# Patient Record
Sex: FEMALE | Race: WHITE | URBAN - METROPOLITAN AREA
[De-identification: names, ages, dates, MRNs, and addresses within clinical notes are randomized per-mention and may not be internally consistent; named-entity substitution may affect disease eponyms.]

---

## 2022-10-06 NOTE — PROGRESS NOTES
Marylin Carl  : 1990  Primary: [address]  Secondary:  [address] @ [facility]  [address] [address]  Phone: [phone]  Fax: [phone] Plan Frequency: one time a week for 90 days  Plan of Care/Certification Expiration Date: 23    PT Visit Info:  No data recorded   Visit Count:  1   OUTPATIENT PHYSICAL THERAPY:OP NOTE TYPE: Treatment Note 10/10/2022       Episode  }Appt Desk             Treatment Diagnosis:  Lack of coordination (muscle incoordination) (R27.8)  Pelvic floor dysfunction in female (M62.98)  Stress incontinence (female) (male) (N39.3)  Bladder pain (R39.89)  Dyspareunia (N94.1)  Medical/Referring Diagnosis:  Pelvic and perineal pain [R10.2]  Referring Physician:  RERE Teague MD Orders:  PT Eval and Treat   Date of Onset:  No data recorded   Allergies:   Patient has no allergy information on record. Restrictions/Precautions:  No data recorded  No data recorded   Interventions Planned (Treatment may consist of any combination of the following):    Current Treatment Recommendations: Strengthening; ROM; ADL/Self-care training; Neuromuscular re-education; Manual Therapy - Soft Tissue Mobilization; Pain management; Return to work related activity; Home exercise program; Equipment evaluation, education, & procurement; Modalities; Therapeutic activities     Subjective Comments:     Initial:}    6/10Post Session:        /10  Medications Last Reviewed:  10/10/2022  Updated Objective Findings:  See evaluation note from today  Treatment   THERAPEUTIC EXERCISE: (10 minutes):    Exercises per grid below to improve mobility, strength, and coordination. Required minimal visual, verbal, manual, and tactile cues to promote proper body alignment, promote proper body posture, promote proper body mechanics, and promote proper body breathing techniques.   Progressed resistance, range, repetitions, and complexity of movement as indicated. Date:  10-10-22 Date:   Date:     Activity/Exercise Parameters Parameters Parameters   Patient Education Discussed HEP and POC     Drops Reviewed and given handout     Surjit mclean Reviewed and given handout        All exercises performed with emphasis on kegel and breathing in order improve coordination, awareness and to minimize symptoms. MANUAL THERAPY: ( minutes): to improve soft tissue tone    Date Type Location Comments   10/10/2022 Internal assessment/treatment                                         (Used abbreviations: MET - muscle energy technique; SCS- Strain counter strain; CTM-Connective tissue mobilizations; CR- Contract/Relax; SP- Sustained pressure; PIT- Positional inhibition techniques; STM Soft -tissue mobilization; MM- Myofascial mobilization; TrP-Trigger point release; IASTM- Instrument assisted soft tissue mobilizations, TDN-Trigger point dry needling)    Pt gives verbal consent to internal vaginal assessment/treatment without chaperon present. NEURO REEDUCATION: () to improve control and coordination of pelvic floor     Date:   Date:   Date:     Activity/Exercise Parameters Parameters Parameters   Biofeedback With sEMG was utilized for coordination of PFM.                                                 THERAPEUTIC ACTIVITY: ( minutes):     TA Educational Topic Notes Date Completed   Pathology/Anatomy/PFM Function     Bladder health education     Urinary urge suppression     The knack     Voiding strategies     Nocturia tips     Bowel/Bladder log     Bowel health education     Constipation care     Diarrhea/Fecal leakage     Colonic massage     Toilet positioning     Defecation dynamics     Sources of fiber     Return to intercourse/Dilator training     Sexual positioning     Lubricants/vaginal moisturizers     Vaginal irritants     Body mechanics     Posture/ergonomics     Diaphragmatic breathing     Resources and technology          Treatment/Session Summary:    Treatment Assessment:     Communication/Consultation:  None today  Equipment provided today:  None  Recommendations/Intent for next treatment session: Next visit will focus on biofeedback. Total Treatment Billable Duration:  10 minutes  Time In: 1000  Time Out: [address]. Yang San, PT       Charge Capture  }Post Session Pain  PT Visit Info  [facility] Portal  MD Guidelines  Scanned Media  Benefits  MyChart    No future appointments.

## 2022-10-06 NOTE — THERAPY EVALUATION
Fabiano Keene  : 1990  Primary: [address]  Secondary:  [address] @ [facility]  [address] [address]  Phone: [phone]  Fax: [phone] Plan Frequency: one time a week for 90 days  Plan of Care/Certification Expiration Date: 23    PT Visit Info:         Visit Count:  1    OUTPATIENT PHYSICAL THERAPY:OP NOTE TYPE: Initial Assessment 10/10/2022               Episode  Appt Desk         Treatment Diagnosis:  Lack of coordination (muscle incoordination) (R27.8)  Pelvic floor dysfunction in female (M62.98)  Stress incontinence (female) (male) (N39.3)  Bladder pain (R39.89)  Dyspareunia (N94.1)  Medical/Referring Diagnosis:  Pelvic and perineal pain [R10.2]  Referring Physician:  RERE Bergeron MD Orders:  PT Eval and Treat   Return MD Appt:  2022  Date of Onset:      Allergies:  Patient has no allergy information on record. Restrictions/Precautions:           Medications Last Reviewed:  10/10/2022     SUBJECTIVE   History of Injury/Illness (Reason for Referral):  Ms. Gama Rodriguez is a 33 yo female referred to pelvic floor physical therapy (PFPT) by RERE Bergeron 2/2 Pelvic and perineal pain [R10.2] Pt reports that symptoms began 2022. Ended up having a UTI but didn't know and got septic so she went to ER. She did a few rounds of rochepin, fluids, pain medicine. She stayed a few days in the hospital. She has chronic UTIs. She gets treated with antibiotics. She has been treated with instillations. She has trace amounts of blood. She takes amitriplyine. She had pneumonia a few weeks ago and had to go on levaquin and this was the only thing that helped bladder pain. Urogesic blue makes it worse. She was diagnosed with IC in 2016. When she was pregnant 4 years ago she had a lot of pain. They kept telling her she had  UTI. She recently just started the bladder diet. She gets leakage with coughing/sneezing.  Leakage started before she had a kid. Urinary: Frequency 14+ x/day, 1-2 x/night. Positive for stress urinary incontinence, urinary urgency, urinary frequency, urinary hesitancy/intermittency, dysuria, and nocturia. Negative for urge urinary incontinence, incomplete emptying, hematuria, and nocturnal enuresis. Pt does use pads for urinary protection; 1+ pads per day (PPD). Fluid intake: 16 oz water/day; bladder irritants include: coke. Pt does not limit fluid intake due to bladder control. Bowel: Frequency variable D to C. Positive for pain with bowel movement, pushing/straining with bowel movement, fecal incontinence, constipation, and incomplete emptying. Negative for pain with bowel movement, pushing/straining with bowel movement, fecal incontinence, constipation, and incomplete emptying. Pt does use pads for bowel protection; 1 pads per day (PPD). Use of stool softeners or laxatives? No    Sexual: Pt is  sexually active with male partners. Contraception/birth control: none. positive for dyspareunia. Pain is superficial and deep. History of sexual abuse: No    Pelvic Organ Prolapse/Pelvic Pain: Location: 6/10 (current), 3/10 (best), 10/10 (worst). OB History: Number of pregnancies: 1 Number of vaginal deliveries: 1 Number of c-sections: 0. Ripped up towards urethra (nerve bundle)      Initial:     6/10 Post Session:      /10  Past Medical History/Comorbidities:   Ms. Lorine Kanner  has no past medical history on file. Ms. Lorine Kanner  has no past surgical history on file.   Social History/Living Environment:   Lives With: Family     Prior Level of Function/Work/Activity:   Prior level of function: Independent  Occupation: Unemployed  [address]: none           Learning:   Does the patient/guardian have any barriers to learning?: No barriers  Will there be a co-learner?: No  What is the preferred language of the patient/guardian?: English  Is an  required?: No  How does the patient/guardian prefer to learn new concepts?: Listening; Reading; Demonstration; Pictures/Videos     Fall Risk Scale: Spencer Total Score: 0  Spencer Fall Risk: Low (0-24)           OBJECTIVE   OBSERVATION:   External Observations:  Voluntary contraction: [] absent     [x] present  Involuntary contraction: [] absent     [x] present  Involuntary relaxation: [] absent     [x] present  Perineal descent at rest: [] absent     [x] present  Perineal descent with bearing: [] absent     [x] present      Pelvic Floor Muscle (PFM) Assessment:  Vaginal vault size: [] decreased     [] increased     [x] WNL  Muscle volume: [] decreased     [x] WNL     [] Defect  PFM tension: [] decreased     [x] increased     [] WNL    Contraction ability:  Voluntary contraction: [] absent     [x] weak     [] moderate      [] strong  Voluntary relaxation: [] absent     [x] partial     [] complete   MMT: 1/5   Quality of contractions: paradoxical movement of PFM  Overflow: [] absent     [x] min     [] mod     [] severe / Compensatory mm groups include abdominals  Levator Avulsion: [x] Negative  [] Positive    Tissue laxity test:  Anterior wall: [] minimum     [] moderate     [] severe      [x] WNL  Posterior wall: [] minimum     [] moderate     [] severe      [x] WNL      Palpation: via vaginal canal   Superficial Pelvic Floor Musculature (PFM): Tender? Intermediate PFM Tender? Deep PFM Tender?    Superficial Transverse Perineal [x] Right  [x] Left  []DNT Deep Transverse Perineal [x] Right  [x] Left  []DNT Puborectalis [x] Right  [x] Left  []DNT   Ischiocavernosus [] Right  [] Left  []DNT Compressor Urethra [] Right  [] Left  []DNT Pubococcygeus [x] Right  [x] Left  []DNT   Bulbocavernosus [] Right  [] Left  []DNT   Ileococcygeus [x] Right  [x] Left  []DNT       Obturator Internus [] Right  [] Left  []DNT       Coccygeus [] Right  [] Left  []DNT             ASSESSMENT   Initial Assessment:  Marylin Carl presents with musculoskeletal limitations including pelvic floor muscle (PFM) tension, reduced PFM Range of Motion (ROM), increased tenderness to palpation of the PFM, reduced coordination and awareness of PFM, abdominal soft tissue restrictions, and decreased pelvic floor muscle (PFM) strength. These limitations are impairing the patient's ability to properly coordinate perineal elevation and descent for normalized PFM function, contributing to sexual dysfunction and voiding dysfunction. As a result, she is limited in her/his ability to participate in household chores, physical activities such as walking, swimming, or other exercise, and social activities outside of the home. Problem List: (Impacting functional limitations): Body Structures, Functions, Activity Limitations Requiring Skilled Therapeutic Intervention: Decreased ADL status; Decreased ROM; Decreased strength; Decreased endurance; Decreased fine motor control; Decreased coordination; Increased pain     Therapy Prognosis:   Therapy Prognosis: Good     Assessment Complexity:   Low Complexity  PLAN   Effective Dates: 10-10-22 TO Plan of Care/Certification Expiration Date: 01/08/23   Frequency/Duration: Plan Frequency: one time a week for 90 days   Interventions Planned (Treatment may consist of any combination of the following):    Current Treatment Recommendations: Strengthening; ROM; ADL/Self-care training; Neuromuscular re-education; Manual Therapy - Soft Tissue Mobilization; Pain management; Return to work related activity; Home exercise program; Equipment evaluation, education, & procurement; Modalities; Therapeutic activities     Goals: (Goals have been discussed and agreed upon with patient.)  Short-Term Functional Goals: Time Frame: 6 weeks  Pt will demonstrate I with basic PFM HEP to improve awareness, coordination, and timing of PFM.   Patient will demonstrate understanding of and ability to teach back appropriate water intake, bladder irritants, toileting frequency, and positioning for improved self-management of symptoms. Patient will demonstrate ability to perform diaphragmatic breathing in multiple positions to assist in pelvic floor tension reduction. Patient will verbalize understanding and demonstrate postural adjustments which facilitate lengthening and reduce overall pelvic floor muscle activity. Discharge Goals: Time Frame: 12 weeks  Patient will be independent in a home dilator and/or graded exposure program, to be performed daily, in order to reduce pain and improved tolerance of tampon use, gynecological screening, and/or sexual intercourse. Patient will demonstrate independence with a home exercise program for aerobic conditioning, core stabilization, and general mobility for independent management of symptoms. Patient will demonstrate normal voluntary relaxation of the pelvic floor muscle group to improve pelvic floor ROM and tolerate pain free vaginal penetration. Pelvic Floor Impact Questionnaire--short form 7 (PFIQ-7):  Score:  Initial:   Bladder or Urine: 76/100  Bowel or Rectum: 76/100  Vagina or Pelvis: 76/100 Most Recent: X (Date: -- )  Bladder or Urine: X/100  Bowel or Rectum: X/100  Vagina or Pelvis: X/100   Interpretation of Score: Each of the 7 sections is scored on a scale from 0-3; 0 representing \"Not at all\", 3 representing \"Quite a bit\". The mean value is taken from all the answered items, then multiplied by 100 to obtain the scale score, ranging from 0-100. This process is repeated for each column representing bowel, bladder, and pelvic pain. Medical Necessity:   > Patient demonstrates good rehab potential due to higher previous functional level. Reason For Services/Other Comments:  > Patient continues to require skilled intervention due to above mentioned deficits.   Total Duration:  Time In: 1000  Time Out: 80    Regarding Steven Jensen therapy, I certify that the treatment plan above will be carried out by a therapist or under their direction. Thank you for this referral,  Izzy Painter. Nidia Mckinney     Referring Physician Signature: RERE Snell No Signature is Required for this note.         Post Session Pain  Charge Capture  PT Visit Info MD Guidelines  Annie

## 2022-10-10 ENCOUNTER — HOSPITAL ENCOUNTER (OUTPATIENT)
Dept: PHYSICAL THERAPY | Age: 32
Setting detail: RECURRING SERIES
Discharge: HOME | End: 2022-10-13
Payer: COMMERCIAL

## 2022-10-10 PROCEDURE — 97161 PT EVAL LOW COMPLEX 20 MIN: CPT

## 2022-10-10 PROCEDURE — 97110 THERAPEUTIC EXERCISES: CPT

## 2022-10-10 ASSESSMENT — PAIN SCALES - GENERAL: PAINLEVEL_OUTOF10: 6

## 2022-10-13 NOTE — PROGRESS NOTES
Rex Engel  : 1990  Primary: [address]  Secondary:  [address] @ [facility]  [address] [address]  Phone: [phone]  Fax: [phone] Plan Frequency: one time a week for 90 days  Plan of Care/Certification Expiration Date: 23      PT Visit Info:  No data recorded   Visit Count:  2   OUTPATIENT PHYSICAL THERAPY:OP NOTE TYPE: Treatment Note 10/17/2022       Episode  }Appt Desk             Treatment Diagnosis:  Lack of coordination (muscle incoordination) (R27.8)  Pelvic floor dysfunction in female (M62.98)  Stress incontinence (female) (male) (N39.3)  Bladder pain (R39.89)  Dyspareunia (N94.1)  Medical/Referring Diagnosis:  Pelvic and perineal pain [R10.2]  Referring Physician:  RERE Sifuentes MD Orders:  PT Eval and Treat   Date of Onset:  No data recorded   Allergies:   Patient has no allergy information on record. Restrictions/Precautions:  No data recorded  No data recorded   Interventions Planned (Treatment may consist of any combination of the following):    Current Treatment Recommendations: Strengthening; ROM; ADL/Self-care training; Neuromuscular re-education; Manual Therapy - Soft Tissue Mobilization; Pain management; Return to work related activity; Home exercise program; Equipment evaluation, education, & procurement; Modalities; Therapeutic activities     Subjective Comments: Patient reports she is the same as last time. She has been better with water and has ordered the squatty potty. Initial:}    3/10Post Session:        /10  Medications Last Reviewed:  10/17/2022  Updated Objective Findings:    Ms. Ashley Ahumada is a 33 yo female referred to pelvic floor physical therapy (PFPT) by RERE Sifuentes 2/2 Pelvic and perineal pain [R10.2] Pt reports that symptoms began 2022. Ended up having a UTI but didn't know and got septic so she went to ER. She did a few rounds of rochepin, fluids, pain medicine.  She stayed a few days in the hospital. She has chronic UTIs. She gets treated with antibiotics. She has been treated with instillations. She has trace amounts of blood. She takes amitriplyine. She had pneumonia a few weeks ago and had to go on levaquin and this was the only thing that helped bladder pain. Urogesic blue makes it worse. She was diagnosed with IC in 2016. When she was pregnant 4 years ago she had a lot of pain. They kept telling her she had  UTI. She recently just started the bladder diet. She gets leakage with coughing/sneezing. Leakage started before she had a kid. Urinary: Frequency 14+ x/day, 1-2 x/night. Positive for stress urinary incontinence, urinary urgency, urinary frequency, urinary hesitancy/intermittency, dysuria, and nocturia. Negative for urge urinary incontinence, incomplete emptying, hematuria, and nocturnal enuresis. Pt does use pads for urinary protection; 1+ pads per day (PPD). Fluid intake: 16 oz water/day; bladder irritants include: coke. Pt does not limit fluid intake due to bladder control. Bowel: Frequency variable D to C. Positive for pain with bowel movement, pushing/straining with bowel movement, fecal incontinence, constipation, and incomplete emptying. Negative for pain with bowel movement, pushing/straining with bowel movement, fecal incontinence, constipation, and incomplete emptying. Pt does use pads for bowel protection; 1 pads per day (PPD). Use of stool softeners or laxatives? No     Sexual: Pt is  sexually active with male partners. Contraception/birth control: none. positive for dyspareunia. Pain is superficial and deep. History of sexual abuse: No     Pelvic Organ Prolapse/Pelvic Pain: Location: 6/10 (current), 3/10 (best), 10/10 (worst). OB History: Number of pregnancies: 1 Number of vaginal deliveries: 1 Number of c-sections: 0.  Ripped up towards urethra (nerve bundle)    OBSERVATION:   External Observations:  Voluntary contraction: [] absent     [x] present  Involuntary contraction: [] absent     [x] present  Involuntary relaxation: [] absent     [x] present  Perineal descent at rest: [] absent     [x] present  Perineal descent with bearing: [] absent     [x] present        Pelvic Floor Muscle (PFM) Assessment:  Vaginal vault size: [] decreased     [] increased     [x] WNL  Muscle volume: [] decreased     [x] WNL     [] Defect  PFM tension: [] decreased     [x] increased     [] WNL     Contraction ability:  Voluntary contraction: [] absent     [x] weak     [] moderate      [] strong  Voluntary relaxation: [] absent     [x] partial     [] complete   MMT: 1/5   Quality of contractions: paradoxical movement of PFM  Overflow: [] absent     [x] min     [] mod     [] severe / Compensatory mm groups include abdominals  Levator Avulsion: [x] Negative  [] Positive     Tissue laxity test:  Anterior wall: [] minimum     [] moderate     [] severe      [x] WNL  Posterior wall: [] minimum     [] moderate     [] severe      [x] WNL        Palpation: via vaginal canal   Superficial Pelvic Floor Musculature (PFM): Tender? Intermediate PFM Tender? Deep PFM Tender? Superficial Transverse Perineal [x] Right  [x] Left  []DNT Deep Transverse Perineal [x] Right  [x] Left  []DNT Puborectalis [x] Right  [x] Left  []DNT   Ischiocavernosus [] Right  [] Left  []DNT Compressor Urethra [] Right  [] Left  []DNT Pubococcygeus [x] Right  [x] Left  []DNT   Bulbocavernosus [] Right  [] Left  []DNT     Ileococcygeus [x] Right  [x] Left  []DNT           Obturator Internus [] Right  [] Left  []DNT           Coccygeus [] Right  [] Left  []DNT        Treatment   THERAPEUTIC EXERCISE: (15 minutes):    Exercises per grid below to improve mobility, strength, and coordination. Required minimal visual, verbal, manual, and tactile cues to promote proper body alignment, promote proper body posture, promote proper body mechanics, and promote proper body breathing techniques. Progressed resistance, range, repetitions, and complexity of movement as indicated. Date:  10-10-22 Date:  10-17-22 Date:     Activity/Exercise Parameters Parameters Parameters   Patient Education Discussed HEP and POC     Drops Reviewed and given handout Reviewed for all positions    Squatty potty Reviewed and given handout     SKTC  30 sec holds    butterfly  30 sec holds    Figure 4  30 sec holds             All exercises performed with emphasis on kegel and breathing in order improve coordination, awareness and to minimize symptoms. MANUAL THERAPY: ( minutes): to improve soft tissue tone    Date Type Location Comments   10/17/2022 Internal assessment/treatment                                         (Used abbreviations: MET - muscle energy technique; SCS- Strain counter strain; CTM-Connective tissue mobilizations; CR- Contract/Relax; SP- Sustained pressure; PIT- Positional inhibition techniques; STM Soft -tissue mobilization; MM- Myofascial mobilization; TrP-Trigger point release; IASTM- Instrument assisted soft tissue mobilizations, TDN-Trigger point dry needling)    Pt gives verbal consent to internal vaginal assessment/treatment without chaperon present. NEURO REEDUCATION: (40 minutes) to improve control and coordination of pelvic floor     Date:  10-17-22 Date:   Date:     Activity/Exercise Parameters Parameters Parameters   Biofeedback With sEMG was utilized for coordination of PFM.  Supine, Sitting, Standing                                               THERAPEUTIC ACTIVITY: ( minutes):     TA Educational Topic Notes Date Completed   Pathology/Anatomy/PFM Function     Bladder health education     Urinary urge suppression     The knack     Voiding strategies     Nocturia tips     Bowel/Bladder log     Bowel health education     Constipation care     Diarrhea/Fecal leakage     Colonic massage     Toilet positioning     Defecation dynamics     Sources of fiber     Return to intercourse/Dilator training     Sexual positioning     Lubricants/vaginal moisturizers     Vaginal irritants     Body mechanics     Posture/ergonomics     Diaphragmatic breathing     Resources and technology          Treatment/Session Summary:    Treatment Assessment: Patient able to coordination PFM relaxing better after biofeedback     Communication/Consultation:  None today  Equipment provided today:  None  Recommendations/Intent for next treatment session: Next visit will focus on biofeedback. Total Treatment Billable Duration:  15 minutes there ex, 40 minutes neuro  Time In: 0900  Time Out: [address] FRANKLIN Beltran, PT       Charge Capture  }Post Session Pain  PT Visit Info  [address] Portal  MD Guidelines  Scanned Media  Benefits  MyChart    Future Appointments   Date Time Provider Dilia Izquierdo   10/24/2022  9:00 AM Gaylord Goldberg, PT [facility]   10/31/2022  9:00 AM Gaylord Goldberg, PT [facility]   11/7/2022 10:00 AM Gaylord Goldberg, PT [facility]   11/14/2022 10:00 AM Gaylord Goldberg, PT [facility]   11/21/2022  9:00 AM Gaylord Goldberg, PT [facility]

## 2022-10-17 ENCOUNTER — HOSPITAL ENCOUNTER (OUTPATIENT)
Dept: PHYSICAL THERAPY | Age: 32
Setting detail: RECURRING SERIES
Discharge: HOME | End: 2022-10-20
Payer: COMMERCIAL

## 2022-10-17 PROCEDURE — 97112 NEUROMUSCULAR REEDUCATION: CPT

## 2022-10-17 PROCEDURE — 97110 THERAPEUTIC EXERCISES: CPT

## 2022-10-17 ASSESSMENT — PAIN SCALES - GENERAL: PAINLEVEL_OUTOF10: 3

## 2022-10-24 ENCOUNTER — HOSPITAL ENCOUNTER (OUTPATIENT)
Dept: PHYSICAL THERAPY | Age: 32
Setting detail: RECURRING SERIES
End: 2022-10-24
Payer: COMMERCIAL

## 2022-10-24 NOTE — PROGRESS NOTES
DATE: 10/24/2022    Patient canceled appointment less than 24 hours notice due to stomach bug.        Blaine Ledezma.  Walt Swartz

## 2022-10-31 ENCOUNTER — HOSPITAL ENCOUNTER (OUTPATIENT)
Dept: PHYSICAL THERAPY | Age: 32
Setting detail: RECURRING SERIES
Discharge: HOME | End: 2022-11-03
Payer: COMMERCIAL

## 2022-10-31 ASSESSMENT — PAIN SCALES - GENERAL: PAINLEVEL_OUTOF10: 3

## 2022-10-31 NOTE — PROGRESS NOTES
DATE: 10/31/2022    Patient reports she is having a lot of trouble with her gastro system. She has constant pain in upper GI. They are trying to get her into endoscopy. She had a nerve conduction study and has sensory nerve damage in the lower body. Was told she has peripheral neruopathy. They are trying to find an underlying issue. She goes this week for her upper half. She is getting an MRI of brain and neck to rule out MS. Prior to therapy today she reports having a bowel movement with blood and blood clots. Patient has made an appointment to see her doctor today. No treatment provided today. Indigo Pritchard.  Prudencio Sterling

## 2022-11-03 NOTE — PROGRESS NOTES
Radames Oakes  : 1990  Primary: [address]  Secondary:  [address] @ [employer] [facility]  [address] [address]  Phone: [phone]  Fax: [phone] Plan Frequency: one time a week for 90 days  Plan of Care/Certification Expiration Date: 23      PT Visit Info:  No data recorded   Visit Count:  4   OUTPATIENT PHYSICAL THERAPY:OP NOTE TYPE: Treatment Note 2022       Episode  }Appt Desk             Treatment Diagnosis:  Lack of coordination (muscle incoordination) (R27.8)  Pelvic floor dysfunction in female (M62.98)  Stress incontinence (female) (male) (N39.3)  Bladder pain (R39.89)  Dyspareunia (N94.1)  Medical/Referring Diagnosis:  Pelvic and perineal pain [R10.2]  Referring Physician:  RERE Andrade MD Orders:  PT Eval and Treat   Date of Onset:  No data recorded   Allergies:   Patient has no allergy information on record. Restrictions/Precautions:  No data recorded  No data recorded   Interventions Planned (Treatment may consist of any combination of the following):    Current Treatment Recommendations: Strengthening; ROM; ADL/Self-care training; Neuromuscular re-education; Manual Therapy - Soft Tissue Mobilization; Pain management; Return to work related activity; Home exercise program; Equipment evaluation, education, & procurement; Modalities; Therapeutic activities     Subjective Comments: Patient reports she went to her primary and her GI. She had a CT scan. She has a cyst on her left kidney. She had her nerve conduction study on Wednesday and has carpal tunnel on both hands. She goes back tomorrow to neurology for follow up. She is trying to get into endoscopy and colonoscopy. She has been constipated since last time. Continues to have pain, urinary urgency, urinary leakage.       Initial:}     /10Post Session:        /10  Medications Last Reviewed:  2022  Updated Objective Findings:    Ms. Taylor Naranjo is a 35 yo female referred to pelvic floor physical therapy (PFPT) by RERE Knight 2/2 Pelvic and perineal pain [R10.2] Pt reports that symptoms began July 2022. Ended up having a UTI but didn't know and got septic so she went to ER. She did a few rounds of rochepin, fluids, pain medicine. She stayed a few days in the hospital. She has chronic UTIs. She gets treated with antibiotics. She has been treated with instillations. She has trace amounts of blood. She takes amitriplyine. She had pneumonia a few weeks ago and had to go on levaquin and this was the only thing that helped bladder pain. Urogesic blue makes it worse. She was diagnosed with IC in 2016. When she was pregnant 4 years ago she had a lot of pain. They kept telling her she had  UTI. She recently just started the bladder diet. She gets leakage with coughing/sneezing. Leakage started before she had a kid. Urinary: Frequency 14+ x/day, 1-2 x/night. Positive for stress urinary incontinence, urinary urgency, urinary frequency, urinary hesitancy/intermittency, dysuria, and nocturia. Negative for urge urinary incontinence, incomplete emptying, hematuria, and nocturnal enuresis. Pt does use pads for urinary protection; 1+ pads per day (PPD). Fluid intake: 16 oz water/day; bladder irritants include: coke. Pt does not limit fluid intake due to bladder control. Bowel: Frequency variable D to C. Positive for pain with bowel movement, pushing/straining with bowel movement, fecal incontinence, constipation, and incomplete emptying. Negative for pain with bowel movement, pushing/straining with bowel movement, fecal incontinence, constipation, and incomplete emptying. Pt does use pads for bowel protection; 1 pads per day (PPD). Use of stool softeners or laxatives? No     Sexual: Pt is  sexually active with male partners. Contraception/birth control: none. positive for dyspareunia. Pain is superficial and deep.  History of sexual abuse: No     Pelvic Organ Prolapse/Pelvic Pain: Location: 6/10 (current), 3/10 (best), 10/10 (worst). OB History: Number of pregnancies: 1 Number of vaginal deliveries: 1 Number of c-sections: 0. Ripped up towards urethra (nerve bundle)    OBSERVATION:   External Observations:  Voluntary contraction: [] absent     [x] present  Involuntary contraction: [] absent     [x] present  Involuntary relaxation: [] absent     [x] present  Perineal descent at rest: [] absent     [x] present  Perineal descent with bearing: [] absent     [x] present        Pelvic Floor Muscle (PFM) Assessment:  Vaginal vault size: [] decreased     [] increased     [x] WNL  Muscle volume: [] decreased     [x] WNL     [] Defect  PFM tension: [] decreased     [x] increased     [] WNL     Contraction ability:  Voluntary contraction: [] absent     [x] weak     [] moderate      [] strong  Voluntary relaxation: [] absent     [x] partial     [] complete   MMT: 1/5   Quality of contractions: paradoxical movement of PFM  Overflow: [] absent     [x] min     [] mod     [] severe / Compensatory mm groups include abdominals  Levator Avulsion: [x] Negative  [] Positive     Tissue laxity test:  Anterior wall: [] minimum     [] moderate     [] severe      [x] WNL  Posterior wall: [] minimum     [] moderate     [] severe      [x] WNL        Palpation: via vaginal canal   Superficial Pelvic Floor Musculature (PFM): Tender? Intermediate PFM Tender? Deep PFM Tender?    Superficial Transverse Perineal [x] Right  [x] Left  []DNT Deep Transverse Perineal [x] Right  [x] Left  []DNT Puborectalis [x] Right  [x] Left  []DNT   Ischiocavernosus [] Right  [] Left  []DNT Compressor Urethra [] Right  [] Left  []DNT Pubococcygeus [x] Right  [x] Left  []DNT   Bulbocavernosus [] Right  [] Left  []DNT     Ileococcygeus [x] Right  [x] Left  []DNT           Obturator Internus [] Right  [] Left  []DNT           Coccygeus [] Right  [] Left  []DNT        Treatment   THERAPEUTIC EXERCISE: 215 minutes): Exercises per grid below to improve mobility, strength, and coordination. Required minimal visual, verbal, manual, and tactile cues to promote proper body alignment, promote proper body posture, promote proper body mechanics, and promote proper body breathing techniques. Progressed resistance, range, repetitions, and complexity of movement as indicated. Date:  10-10-22 Date:  10-17-22 Date:  11-7-22   Activity/Exercise Parameters Parameters Parameters   Patient Education Discussed HEP and POC     Drops Reviewed and given handout Reviewed for all positions    Squatty potty Reviewed and given handout     SKTC  30 sec holds 30 sec holds   butterfly  30 sec holds 30 sec holds   Figure 4  30 sec holds 30 sec holds   bridge   x20   clamshell   X20 blue band   Seated march   x20   Quadruped leg extension   x20                        All exercises performed with emphasis on kegel and breathing in order improve coordination, awareness and to minimize symptoms. MANUAL THERAPY: ( 30 minutes): to improve soft tissue tone    Date Type Location Comments   11/7/2022 Internal assessment/treatment Via vaginal canal SP, CTM                                       (Used abbreviations: MET - muscle energy technique; SCS- Strain counter strain; CTM-Connective tissue mobilizations; CR- Contract/Relax; SP- Sustained pressure; PIT- Positional inhibition techniques; STM Soft -tissue mobilization; MM- Myofascial mobilization; TrP-Trigger point release; IASTM- Instrument assisted soft tissue mobilizations, TDN-Trigger point dry needling)    Pt gives verbal consent to internal vaginal assessment/treatment without chaperon present. NEURO REEDUCATION: (0 minutes) to improve control and coordination of pelvic floor     Date:  10-17-22 Date:   Date:     Activity/Exercise Parameters Parameters Parameters   Biofeedback With sEMG was utilized for coordination of PFM.  Supine, Sitting, Standing THERAPEUTIC ACTIVITY: ( minutes):     TA Educational Topic Notes Date Completed   Pathology/Anatomy/PFM Function     Bladder health education     Urinary urge suppression     The knack     Voiding strategies     Nocturia tips     Bowel/Bladder log     Bowel health education     Constipation care     Diarrhea/Fecal leakage     Colonic massage     Toilet positioning     Defecation dynamics     Sources of fiber     Return to intercourse/Dilator training     Sexual positioning     Lubricants/vaginal moisturizers     Vaginal irritants     Body mechanics     Posture/ergonomics     Diaphragmatic breathing     Resources and technology          Treatment/Session Summary:    Treatment Assessment: Patient with increased tightness to deeper layers today. Communication/Consultation:  None today  Equipment provided today:  None  Recommendations/Intent for next treatment session: Next visit will focus on biofeedback. Total Treatment Billable Duration:  25 minutes there ex, 30 minutes manual  Time In: 1000  Time Out: [address].  Ioana Aponte, PT       Charge Capture  }Post Session Pain  PT Visit Info  [address] Portal  MD Guidelines  Scanned Media  Benefits  MyChart    Future Appointments   Date Time Provider Dilia Izquierdo   11/14/2022 10:00 AM Lynn Parikh PT [facility]   11/21/2022  9:00 [facility]

## 2022-11-07 ENCOUNTER — HOSPITAL ENCOUNTER (OUTPATIENT)
Dept: PHYSICAL THERAPY | Age: 32
Setting detail: RECURRING SERIES
Discharge: HOME | End: 2022-11-10
Payer: COMMERCIAL

## 2022-11-07 PROCEDURE — 97110 THERAPEUTIC EXERCISES: CPT

## 2022-11-07 PROCEDURE — 97140 MANUAL THERAPY 1/> REGIONS: CPT

## 2022-11-09 NOTE — PROGRESS NOTES
Melany Enriquez  : 1990  Primary: [facility]  Secondary:  Nery Dan @ [facility]  [address] [address]  Phone: [phone]  Fax: [phone] Plan Frequency: one time a week for 90 days  Plan of Care/Certification Expiration Date: 23      PT Visit Info:  No data recorded   Visit Count:  5   OUTPATIENT PHYSICAL THERAPY:OP NOTE TYPE: Treatment Note 2022       Episode  }Appt Desk             Treatment Diagnosis:  Lack of coordination (muscle incoordination) (R27.8)  Pelvic floor dysfunction in female (M62.98)  Stress incontinence (female) (male) (N39.3)  Bladder pain (R39.89)  Dyspareunia (N94.1)  Medical/Referring Diagnosis:  Pelvic and perineal pain [R10.2]  Referring Physician:  RERE Rey MD Orders:  PT Eval and Treat   Date of Onset:  No data recorded   Allergies:   Patient has no allergy information on record. Restrictions/Precautions:  No data recorded  No data recorded   Interventions Planned (Treatment may consist of any combination of the following):    Current Treatment Recommendations: Strengthening; ROM; ADL/Self-care training; Neuromuscular re-education; Manual Therapy - Soft Tissue Mobilization; Pain management; Return to work related activity; Home exercise program; Equipment evaluation, education, & procurement; Modalities; Therapeutic activities     Subjective Comments: Patient reports the neurosurgeon didn't see anything surgical. She was then referred to neurology. She went there and he wants to do a MRI (with and without contrast on brain). She see's cardiology Thursday to get cleared for scopes. Bladder and pelvic health is the same. Constipation is bad. Pain is annoying.       Initial:}    4/10Post Session:        /10  Medications Last Reviewed:  2022  Updated Objective Findings:    Ms. Leyla Deluna is a 35 yo female referred to pelvic floor physical therapy (PFPT) by RERE Rey 2/2 Pelvic and perineal pain [R10.2] Pt reports that symptoms began July 2022. Ended up having a UTI but didn't know and got septic so she went to ER. She did a few rounds of rochepin, fluids, pain medicine. She stayed a few days in the hospital. She has chronic UTIs. She gets treated with antibiotics. She has been treated with instillations. She has trace amounts of blood. She takes amitriplyine. She had pneumonia a few weeks ago and had to go on levaquin and this was the only thing that helped bladder pain. Urogesic blue makes it worse. She was diagnosed with IC in 2016. When she was pregnant 4 years ago she had a lot of pain. They kept telling her she had  UTI. She recently just started the bladder diet. She gets leakage with coughing/sneezing. Leakage started before she had a kid. Urinary: Frequency 14+ x/day, 1-2 x/night. Positive for stress urinary incontinence, urinary urgency, urinary frequency, urinary hesitancy/intermittency, dysuria, and nocturia. Negative for urge urinary incontinence, incomplete emptying, hematuria, and nocturnal enuresis. Pt does use pads for urinary protection; 1+ pads per day (PPD). Fluid intake: 16 oz water/day; bladder irritants include: coke. Pt does not limit fluid intake due to bladder control. Bowel: Frequency variable D to C. Positive for pain with bowel movement, pushing/straining with bowel movement, fecal incontinence, constipation, and incomplete emptying. Negative for pain with bowel movement, pushing/straining with bowel movement, fecal incontinence, constipation, and incomplete emptying. Pt does use pads for bowel protection; 1 pads per day (PPD). Use of stool softeners or laxatives? No     Sexual: Pt is  sexually active with male partners. Contraception/birth control: none. positive for dyspareunia. Pain is superficial and deep.  History of sexual abuse: No     Pelvic Organ Prolapse/Pelvic Pain: Location: 6/10 (current), 3/10 (best), 10/10 (worst). OB History: Number of pregnancies: 1 Number of vaginal deliveries: 1 Number of c-sections: 0. Ripped up towards urethra (nerve bundle)    OBSERVATION:   External Observations:  Voluntary contraction: [] absent     [x] present  Involuntary contraction: [] absent     [x] present  Involuntary relaxation: [] absent     [x] present  Perineal descent at rest: [] absent     [x] present  Perineal descent with bearing: [] absent     [x] present        Pelvic Floor Muscle (PFM) Assessment:  Vaginal vault size: [] decreased     [] increased     [x] WNL  Muscle volume: [] decreased     [x] WNL     [] Defect  PFM tension: [] decreased     [x] increased     [] WNL     Contraction ability:  Voluntary contraction: [] absent     [x] weak     [] moderate      [] strong  Voluntary relaxation: [] absent     [x] partial     [] complete   MMT: 1/5   Quality of contractions: paradoxical movement of PFM  Overflow: [] absent     [x] min     [] mod     [] severe / Compensatory mm groups include abdominals  Levator Avulsion: [x] Negative  [] Positive     Tissue laxity test:  Anterior wall: [] minimum     [] moderate     [] severe      [x] WNL  Posterior wall: [] minimum     [] moderate     [] severe      [x] WNL        Palpation: via vaginal canal   Superficial Pelvic Floor Musculature (PFM): Tender? Intermediate PFM Tender? Deep PFM Tender? Superficial Transverse Perineal [x] Right  [x] Left  []DNT Deep Transverse Perineal [x] Right  [x] Left  []DNT Puborectalis [x] Right  [x] Left  []DNT   Ischiocavernosus [] Right  [] Left  []DNT Compressor Urethra [] Right  [] Left  []DNT Pubococcygeus [x] Right  [x] Left  []DNT   Bulbocavernosus [] Right  [] Left  []DNT     Ileococcygeus [x] Right  [x] Left  []DNT           Obturator Internus [] Right  [] Left  []DNT           Coccygeus [] Right  [] Left  []DNT        Treatment   THERAPEUTIC EXERCISE: ( minutes):    Exercises per grid below to improve mobility, strength, and coordination. Required minimal visual, verbal, manual, and tactile cues to promote proper body alignment, promote proper body posture, promote proper body mechanics, and promote proper body breathing techniques. Progressed resistance, range, repetitions, and complexity of movement as indicated. Date:  10-10-22 Date:  10-17-22 Date:  11-7-22 Date:  11-14-22   Activity/Exercise Parameters Parameters Parameters    Patient Education Discussed HEP and POC      Drops Reviewed and given handout Reviewed for all positions     Squatty potty Reviewed and given handout      SKTC  30 sec holds 30 sec holds    butterfly  30 sec holds 30 sec holds    Figure 4  30 sec holds 30 sec holds    bridge   x20 x30   clamshell   X20 blue band X30 blue band B   Seated march   x20 X30 blue band B   Quadruped leg extension   x20 X20     Squat    X20     Chops    All directions x 30 yellow band     Rows      X30 yellow band   Extensions    X30 yellow band        All exercises performed with emphasis on kegel and breathing in order improve coordination, awareness and to minimize symptoms. MANUAL THERAPY: ( 0 minutes): to improve soft tissue tone    Date Type Location Comments   11/14/2022 Internal assessment/treatment Via vaginal canal SP, CTM                                       (Used abbreviations: MET - muscle energy technique; SCS- Strain counter strain; CTM-Connective tissue mobilizations; CR- Contract/Relax; SP- Sustained pressure; PIT- Positional inhibition techniques; STM Soft -tissue mobilization; MM- Myofascial mobilization; TrP-Trigger point release; IASTM- Instrument assisted soft tissue mobilizations, TDN-Trigger point dry needling)    Pt gives verbal consent to internal vaginal assessment/treatment without chaperon present.     NEURO REEDUCATION: (0 minutes) to improve control and coordination of pelvic floor     Date:  10-17-22 Date:   Date:     Activity/Exercise Parameters Parameters Parameters   Biofeedback With sEMG was utilized for coordination of PFM. Supine, Sitting, Standing                                               THERAPEUTIC ACTIVITY: ( minutes):     TA Educational Topic Notes Date Completed   Pathology/Anatomy/PFM Function     Bladder health education     Urinary urge suppression     The knack     Voiding strategies     Nocturia tips     Bowel/Bladder log     Bowel health education     Constipation care     Diarrhea/Fecal leakage     Colonic massage     Toilet positioning     Defecation dynamics     Sources of fiber     Return to intercourse/Dilator training     Sexual positioning     Lubricants/vaginal moisturizers     Vaginal irritants     Body mechanics     Posture/ergonomics     Diaphragmatic breathing     Resources and technology          Treatment/Session Summary:    Treatment Assessment: Patient able to progress there ex      Communication/Consultation:  None today  Equipment provided today:  None  Recommendations/Intent for next treatment session: Next visit will focus on manual, coordination of PFM. Total Treatment Billable Duration:  45 minutes there ex  Time In: 1000  Time Out: [address].  Corine Zapata, BERTRAM       Charge Capture  }Post Session Pain  PT Visit Info  [facility] Portal  MD Guidelines  Scanned Media  Benefits  MyChart    Future Appointments   Date Time Provider Dilia Izquierdo   11/21/2022  9:00 AM Temitope Brar, [facility]

## 2022-11-14 ENCOUNTER — HOSPITAL ENCOUNTER (OUTPATIENT)
Dept: PHYSICAL THERAPY | Age: 32
Setting detail: RECURRING SERIES
Discharge: HOME | End: 2022-11-17
Payer: COMMERCIAL

## 2022-11-14 PROCEDURE — 97110 THERAPEUTIC EXERCISES: CPT

## 2022-11-14 ASSESSMENT — PAIN SCALES - GENERAL: PAINLEVEL_OUTOF10: 4

## 2022-11-17 NOTE — PROGRESS NOTES
Ila Rojas  : 1990  Primary: [address]  Secondary:  [address] @ [facility]  [address] [address]  Phone: [phone]  Fax: [phone] Plan Frequency: one time a week for 90 days  Plan of Care/Certification Expiration Date: 23      PT Visit Info:  No data recorded   Visit Count:  6   OUTPATIENT PHYSICAL THERAPY:OP NOTE TYPE: Treatment Note 2022       Episode  }Appt Desk             Treatment Diagnosis:  Lack of coordination (muscle incoordination) (R27.8)  Pelvic floor dysfunction in female (M62.98)  Stress incontinence (female) (male) (N39.3)  Bladder pain (R39.89)  Dyspareunia (N94.1)  Medical/Referring Diagnosis:  Pelvic and perineal pain [R10.2]  Referring Physician:  RERE Yu MD Orders:  PT Eval and Treat   Date of Onset:  No data recorded   Allergies:   Patient has no allergy information on record. Restrictions/Precautions:  No data recorded  No data recorded   Interventions Planned (Treatment may consist of any combination of the following):    Current Treatment Recommendations: Strengthening; ROM; ADL/Self-care training; Neuromuscular re-education; Manual Therapy - Soft Tissue Mobilization; Pain management; Return to work related activity; Home exercise program; Equipment evaluation, education, & procurement; Modalities; Therapeutic activities     Subjective Comments: Patient reports she has to wear the heart monitor for 30 days. She has to re do the MRI because her muscles would spasm. Pelvic floor doesn't hurt right now. Every time she coughs, sneezes, laughs she cannot hold the pee in. Initial:}     /10Post Session:        /10  Medications Last Reviewed:  2022  Updated Objective Findings:    Ms. Dayan Gill is a 33 yo female referred to pelvic floor physical therapy (PFPT) by RERE Yu 2/2 Pelvic and perineal pain [R10.2] Pt reports that symptoms began 2022.  Ended up having a UTI but didn't know and got septic so she went to ER. She did a few rounds of rochepin, fluids, pain medicine. She stayed a few days in the hospital. She has chronic UTIs. She gets treated with antibiotics. She has been treated with instillations. She has trace amounts of blood. She takes amitriplyine. She had pneumonia a few weeks ago and had to go on levaquin and this was the only thing that helped bladder pain. Urogesic blue makes it worse. She was diagnosed with IC in 2016. When she was pregnant 4 years ago she had a lot of pain. They kept telling her she had  UTI. She recently just started the bladder diet. She gets leakage with coughing/sneezing. Leakage started before she had a kid. Urinary: Frequency 14+ x/day, 1-2 x/night. Positive for stress urinary incontinence, urinary urgency, urinary frequency, urinary hesitancy/intermittency, dysuria, and nocturia. Negative for urge urinary incontinence, incomplete emptying, hematuria, and nocturnal enuresis. Pt does use pads for urinary protection; 1+ pads per day (PPD). Fluid intake: 16 oz water/day; bladder irritants include: coke. Pt does not limit fluid intake due to bladder control. Bowel: Frequency variable D to C. Positive for pain with bowel movement, pushing/straining with bowel movement, fecal incontinence, constipation, and incomplete emptying. Negative for pain with bowel movement, pushing/straining with bowel movement, fecal incontinence, constipation, and incomplete emptying. Pt does use pads for bowel protection; 1 pads per day (PPD). Use of stool softeners or laxatives? No     Sexual: Pt is  sexually active with male partners. Contraception/birth control: none. positive for dyspareunia. Pain is superficial and deep. History of sexual abuse: No     Pelvic Organ Prolapse/Pelvic Pain: Location: 6/10 (current), 3/10 (best), 10/10 (worst).       OB History: Number of pregnancies: 1 Number of vaginal deliveries: 1 Number of c-sections: 0. Ripped up towards urethra (nerve bundle)    OBSERVATION:   External Observations:  Voluntary contraction: [] absent     [x] present  Involuntary contraction: [] absent     [x] present  Involuntary relaxation: [] absent     [x] present  Perineal descent at rest: [] absent     [x] present  Perineal descent with bearing: [] absent     [x] present        Pelvic Floor Muscle (PFM) Assessment:  Vaginal vault size: [] decreased     [] increased     [x] WNL  Muscle volume: [] decreased     [x] WNL     [] Defect  PFM tension: [] decreased     [x] increased     [] WNL     Contraction ability:  Voluntary contraction: [] absent     [x] weak     [] moderate      [] strong  Voluntary relaxation: [] absent     [x] partial     [] complete   MMT: 1/5   Quality of contractions: paradoxical movement of PFM  Overflow: [] absent     [x] min     [] mod     [] severe / Compensatory mm groups include abdominals  Levator Avulsion: [x] Negative  [] Positive     Tissue laxity test:  Anterior wall: [] minimum     [] moderate     [] severe      [x] WNL  Posterior wall: [] minimum     [] moderate     [] severe      [x] WNL        Palpation: via vaginal canal   Superficial Pelvic Floor Musculature (PFM): Tender? Intermediate PFM Tender? Deep PFM Tender? Superficial Transverse Perineal [x] Right  [x] Left  []DNT Deep Transverse Perineal [x] Right  [x] Left  []DNT Puborectalis [x] Right  [x] Left  []DNT   Ischiocavernosus [] Right  [] Left  []DNT Compressor Urethra [] Right  [] Left  []DNT Pubococcygeus [x] Right  [x] Left  []DNT   Bulbocavernosus [] Right  [] Left  []DNT     Ileococcygeus [x] Right  [x] Left  []DNT           Obturator Internus [] Right  [] Left  []DNT           Coccygeus [] Right  [] Left  []DNT        Treatment   THERAPEUTIC EXERCISE: ( 45 minutes):    Exercises per grid below to improve mobility, strength, and coordination.   Required minimal visual, verbal, manual, and tactile cues to promote proper body alignment, promote proper body posture, promote proper body mechanics, and promote proper body breathing techniques. Progressed resistance, range, repetitions, and complexity of movement as indicated. Date:  10-10-22 Date:  10-17-22 Date:  11-7-22 Date:  11-14-22 Date:  11-21-22   Activity/Exercise Parameters Parameters Parameters     Patient Education Discussed HEP and POC       Drops Reviewed and given handout Reviewed for all positions      Squatty potty Reviewed and given handout       SKTC  30 sec holds 30 sec holds     butterfly  30 sec holds 30 sec holds     Figure 4  30 sec holds 30 sec holds     bridge   x20 x30 X30     clamshell   X20 blue band X30 blue band B X30 blue band B   Seated march   x20 X30 blue band B X30 blue band B   Quadruped leg extension   x20 X20   X20   Squat    X20   X20   Chops    All directions x 30 yellow band   X30 blue band B   Rows      X30 yellow band X30 yellow band   Extensions    X30 yellow band   X30 yellow band      All exercises performed with emphasis on kegel and breathing in order improve coordination, awareness and to minimize symptoms. MANUAL THERAPY: ( 0 minutes): to improve soft tissue tone    Date Type Location Comments   11/21/2022 Internal assessment/treatment Via vaginal canal SP, CTM                                       (Used abbreviations: MET - muscle energy technique; SCS- Strain counter strain; CTM-Connective tissue mobilizations; CR- Contract/Relax; SP- Sustained pressure; PIT- Positional inhibition techniques; STM Soft -tissue mobilization; MM- Myofascial mobilization; TrP-Trigger point release; IASTM- Instrument assisted soft tissue mobilizations, TDN-Trigger point dry needling)    Pt gives verbal consent to internal vaginal assessment/treatment without chaperon present.     NEURO REEDUCATION: (0 minutes) to improve control and coordination of pelvic floor     Date:  10-17-22 Date:   Date:     Activity/Exercise Parameters Parameters Parameters Biofeedback With sEMG was utilized for coordination of PFM. Supine, Sitting, Standing                                               THERAPEUTIC ACTIVITY: ( minutes):     TA Educational Topic Notes Date Completed   Pathology/Anatomy/PFM Function     Bladder health education     Urinary urge suppression     The knack     Voiding strategies     Nocturia tips     Bowel/Bladder log     Bowel health education     Constipation care     Diarrhea/Fecal leakage     Colonic massage     Toilet positioning     Defecation dynamics     Sources of fiber     Return to intercourse/Dilator training     Sexual positioning     Lubricants/vaginal moisturizers     Vaginal irritants     Body mechanics     Posture/ergonomics     Diaphragmatic breathing     Resources and technology          Treatment/Session Summary:    Treatment Assessment: Patient able to progress there ex. Patient to follow up after MD.      Communication/Consultation:  None today  Equipment provided today:  None  Recommendations/Intent for next treatment session: Next visit will focus on manual, coordination of PFM. Total Treatment Billable Duration:  40 minutes there ex  Time In: 0900  Time Out: [address] M. Sonia Porras, PT       Charge Capture  }Post Session Pain  PT Visit Info  [facility]  MD Guidelines  Scanned Media  Benefits  MyChart    No future appointments.

## 2022-11-21 ENCOUNTER — HOSPITAL ENCOUNTER (OUTPATIENT)
Dept: PHYSICAL THERAPY | Age: 32
Setting detail: RECURRING SERIES
Discharge: HOME | End: 2022-11-24
Payer: COMMERCIAL

## 2022-11-21 PROCEDURE — 97110 THERAPEUTIC EXERCISES: CPT

## 2023-01-04 NOTE — THERAPY DISCHARGE
Hanane Sheppard  : 1990  Primary: [address]  Secondary:  [address] @ [facility] [address] [address]  Phone: [phone]  Fax: [phone] Plan Frequency: one time a week for 90 days  Plan of Care/Certification Expiration Date: 23      PT Visit Info:         Visit Count:  6    OUTPATIENT PHYSICAL THERAPY:OP NOTE TYPE: Discontinuation Summary 2022               Episode  Appt Desk         Treatment Diagnosis:  Lack of coordination (muscle incoordination) (R27.8)  Pelvic floor dysfunction in female (M62.98)  Stress incontinence (female) (male) (N39.3)  Bladder pain (R39.89)  Dyspareunia (N94.1)  Medical/Referring Diagnosis:  Pelvic and perineal pain [R10.2]  Referring Physician:  RERE Tavares MD Orders:  PT Eval and Treat   Return MD Appt:  2022  Date of Onset:      Allergies:  Patient has no allergy information on record. Restrictions/Precautions:           Medications Last Reviewed:  2022     SUBJECTIVE   History of Injury/Illness (Reason for Referral):  Ms. Cortez Kelly is a 33 yo female referred to pelvic floor physical therapy (PFPT) by RERE Tavares 2/2 Pelvic and perineal pain [R10.2] Pt reports that symptoms began 2022. Ended up having a UTI but didn't know and got septic so she went to ER. She did a few rounds of rochepin, fluids, pain medicine. She stayed a few days in the hospital. She has chronic UTIs. She gets treated with antibiotics. She has been treated with instillations. She has trace amounts of blood. She takes amitriplyine. She had pneumonia a few weeks ago and had to go on levaquin and this was the only thing that helped bladder pain. Urogesic blue makes it worse. She was diagnosed with IC in 2016. When she was pregnant 4 years ago she had a lot of pain. They kept telling her she had  UTI. She recently just started the bladder diet. She gets leakage with coughing/sneezing.  Leakage started before she had a kid. Urinary: Frequency 14+ x/day, 1-2 x/night. Positive for stress urinary incontinence, urinary urgency, urinary frequency, urinary hesitancy/intermittency, dysuria, and nocturia. Negative for urge urinary incontinence, incomplete emptying, hematuria, and nocturnal enuresis. Pt does use pads for urinary protection; 1+ pads per day (PPD). Fluid intake: 16 oz water/day; bladder irritants include: coke. Pt does not limit fluid intake due to bladder control. Bowel: Frequency variable D to C. Positive for pain with bowel movement, pushing/straining with bowel movement, fecal incontinence, constipation, and incomplete emptying. Negative for pain with bowel movement, pushing/straining with bowel movement, fecal incontinence, constipation, and incomplete emptying. Pt does use pads for bowel protection; 1 pads per day (PPD). Use of stool softeners or laxatives? No    Sexual: Pt is  sexually active with male partners. Contraception/birth control: none. positive for dyspareunia. Pain is superficial and deep. History of sexual abuse: No    Pelvic Organ Prolapse/Pelvic Pain: Location: 6/10 (current), 3/10 (best), 10/10 (worst). OB History: Number of pregnancies: 1 Number of vaginal deliveries: 1 Number of c-sections: 0.  Ripped up towards urethra (nerve bundle)                 OBJECTIVE   OBSERVATION:   External Observations:  Voluntary contraction: [] absent     [x] present  Involuntary contraction: [] absent     [x] present  Involuntary relaxation: [] absent     [x] present  Perineal descent at rest: [] absent     [x] present  Perineal descent with bearing: [] absent     [x] present      Pelvic Floor Muscle (PFM) Assessment:  Vaginal vault size: [] decreased     [] increased     [x] WNL  Muscle volume: [] decreased     [x] WNL     [] Defect  PFM tension: [] decreased     [x] increased     [] WNL    Contraction ability:  Voluntary contraction: [] absent     [x] weak [] moderate      [] strong  Voluntary relaxation: [] absent     [x] partial     [] complete   MMT: 1/5   Quality of contractions: paradoxical movement of PFM  Overflow: [] absent     [x] min     [] mod     [] severe / Compensatory mm groups include abdominals  Levator Avulsion: [x] Negative  [] Positive    Tissue laxity test:  Anterior wall: [] minimum     [] moderate     [] severe      [x] WNL  Posterior wall: [] minimum     [] moderate     [] severe      [x] WNL      Palpation: via vaginal canal   Superficial Pelvic Floor Musculature (PFM): Tender? Intermediate PFM Tender? Deep PFM Tender? Superficial Transverse Perineal [x] Right  [x] Left  []DNT Deep Transverse Perineal [x] Right  [x] Left  []DNT Puborectalis [x] Right  [x] Left  []DNT   Ischiocavernosus [] Right  [] Left  []DNT Compressor Urethra [] Right  [] Left  []DNT Pubococcygeus [x] Right  [x] Left  []DNT   Bulbocavernosus [] Right  [] Left  []DNT   Ileococcygeus [x] Right  [x] Left  []DNT       Obturator Internus [] Right  [] Left  []DNT       Coccygeus [] Right  [] Left  []DNT             ASSESSMENT   DISCONTINUATION SUMMARY:  Rafaela Lazcano has attended 5 out of 7 scheduled visits, with 2 cancellation(s) and 0 no shows. I am unable to formally assess progress toward goals at this time due to patient not returning to physical therapy. PLAN        Goals: (Goals have been discussed and agreed upon with patient.)  Short-Term Functional Goals: Time Frame: 6 weeks  Pt will demonstrate I with basic PFM HEP to improve awareness, coordination, and timing of PFM. Patient will demonstrate understanding of and ability to teach back appropriate water intake, bladder irritants, toileting frequency, and positioning for improved self-management of symptoms. Patient will demonstrate ability to perform diaphragmatic breathing in multiple positions to assist in pelvic floor tension reduction.   Patient will verbalize understanding and demonstrate postural adjustments which facilitate lengthening and reduce overall pelvic floor muscle activity. Discharge Goals: Time Frame: 12 weeks  Patient will be independent in a home dilator and/or graded exposure program, to be performed daily, in order to reduce pain and improved tolerance of tampon use, gynecological screening, and/or sexual intercourse. Patient will demonstrate independence with a home exercise program for aerobic conditioning, core stabilization, and general mobility for independent management of symptoms. Patient will demonstrate normal voluntary relaxation of the pelvic floor muscle group to improve pelvic floor ROM and tolerate pain free vaginal penetration. Pelvic Floor Impact Questionnaire--short form 7 (PFIQ-7):  Score:  Initial:   Bladder or Urine: 76/100  Bowel or Rectum: 76/100  Vagina or Pelvis: 76/100 Most Recent: X (Date: -- )  Bladder or Urine: X/100  Bowel or Rectum: X/100  Vagina or Pelvis: X/100   Interpretation of Score: Each of the 7 sections is scored on a scale from 0-3; 0 representing \"Not at all\", 3 representing \"Quite a bit\". The mean value is taken from all the answered items, then multiplied by 100 to obtain the scale score, ranging from 0-100. This process is repeated for each column representing bowel, bladder, and pelvic pain. Kj Anand     Referring Physician Signature: RERE Ozuna No Signature is Required for this note.         Post Session Pain  Charge Capture  PT Visit Info MD Guidelines  Annie

## 2025-02-21 ENCOUNTER — HOSPITAL ENCOUNTER (OUTPATIENT)
Dept: PHYSICAL THERAPY | Age: 35
Setting detail: RECURRING SERIES
Discharge: HOME | End: 2025-02-24
Payer: MEDICAID

## 2025-02-21 DIAGNOSIS — M62.838 OTHER MUSCLE SPASM: Primary | ICD-10-CM

## 2025-02-21 DIAGNOSIS — R27.8 OTHER LACK OF COORDINATION: ICD-10-CM

## 2025-02-21 DIAGNOSIS — N39.3 STRESS INCONTINENCE (FEMALE) (MALE): ICD-10-CM

## 2025-02-21 PROCEDURE — 97530 THERAPEUTIC ACTIVITIES: CPT

## 2025-02-21 PROCEDURE — 97162 PT EVAL MOD COMPLEX 30 MIN: CPT

## 2025-02-21 PROCEDURE — 97110 THERAPEUTIC EXERCISES: CPT

## 2025-02-21 ASSESSMENT — PAIN SCALES - GENERAL: PAINLEVEL_OUTOF10: 3

## 2025-02-21 NOTE — THERAPY EVALUATION
Dalila Herbert  : 1990  Primary: [facility] Medicaid (Medicaid Managed)  Secondary:  [facility] @ [address]  [facility] [address]  Phone: [phone]  Fax: [phone] Plan Frequency: 1x/week for 90 days    Plan of Care/Certification Expiration Date: 25        Plan of Care/Certification Expiration Date:  Plan of Care/Certification Expiration Date: 25    Frequency/Duration: Plan Frequency: 1x/week for 90 days      Time In/Out:   Time In: 844  Time Out: 948      PT Visit Info:    Plan Frequency: 1x/week for 90 days  Progress Note Due Date: 25 (or at 10th visit)  Total # of Visits to Date: 1      Visit Count:  1                OUTPATIENT PHYSICAL THERAPY:             Initial Assessment 2025               Episode (PFPT)         Treatment Diagnosis:     Other muscle spasm  Other lack of coordination  Stress incontinence (female) (male)  Contributing Diagnosis:  Bladder pain (R39.89), Dyspareunia (N94.1), Fecal smearing (Fecal soiling) (R15.1), Frequency of micturition (R35.0), Hesitancy of micturition (R39.11), Myalgia (M79.1), Nocturia (R35.1), Pelvic and perineal pain (R10.2), Pelvic floor dysfunction in female (M62.98), and Urgency of urination (R39.15)  Medical/Referring Diagnosis:    PFD (pelvic floor dysfunction)  Bladder pain    Referring Physician:  Анна Tiwari APRN - NP  MD Orders:  PT Eval and Treat   Return MD Appt:  Unspecified  Date of Onset:  Onset Date: 10/31/24 (date of surgery)     Allergies:  Patient has no allergy information on record.Adhesive  Restrictions/Precautions:    None      Medications Last Reviewed:  2025     SUBJECTIVE   History of Injury/Illness (Reason for Referral):  Ms. Herbert is a 33 yo female referred to pelvic floor physical therapy (PFPT) by Анна Tiwari,* 2/2 PFD and bladder pain.    Has done PT before with Beverly Delgado >2 years ago, wasn't making a huge

## 2025-02-21 NOTE — PROGRESS NOTES
2/21/2025  Updated Objective Findings:  See Evaluation Note from today    Treatment   THERAPEUTIC EXERCISE: (10 minutes): Exercises per grid below to improve mobility, strength, and coordination.  Required minimal visual, verbal, and tactile cues to promote proper body mechanics and promote proper body breathing techniques.  Progressed resistance, range, and repetitions as indicated.   Date:  2/21/25 Date:   Date:     Activity/Exercise Parameters Parameters Parameters   HEP Reviewed     PF drops Reviewed mechanics  10x with cueing  Added to HEP             THERAPEUTIC ACTIVITY: (10 minutes): Functional activity education regarding anatomy, pathology and role of pelvic floor muscle (PFM) function in relation to presenting symptoms and role of pelvic floor therapy in conservative treatment. and Instruction on coordinated pelvic floor and diaphragmatic breathing to improve kinesthetic awareness of pelvic muscle mobility and restore proper motor recruitment patterns with breathing, posture, and functional movement (e.g. appropriate lift/contraction with increased IAP such as a cough, laugh, sneeze to prevent incontinence as well as appropriate relaxation to reduce pain with penetration).  TA Educational Topic Notes Date Completed   Pathology/Anatomy/PFM Function Reviewed 2/21/25   Bladder health education     Urinary urge suppression     The knack     Voiding strategies     Nocturia tips     Bowel/Bladder log     Bowel health education     Constipation care     Diarrhea/Fecal leakage     Colonic massage     Toilet positioning     Defecation dynamics     Sources of fiber     Return to intercourse/Dilator training     Sexual positioning     Lubricants/vaginal moisturizers     Vulvovaginal health/vaginal irritants     Body mechanics     Posture/ergonomics     Diaphragmatic breathing Practiced, reviewed mechanics 2/21/25   Resources and technology     Other patient education       MANUAL THERAPY: (7 minutes): Soft tissue

## 2025-03-03 ENCOUNTER — HOSPITAL ENCOUNTER (OUTPATIENT)
Dept: PHYSICAL THERAPY | Age: 35
Setting detail: RECURRING SERIES
Discharge: HOME | End: 2025-03-06
Payer: MEDICAID

## 2025-03-03 PROCEDURE — 97530 THERAPEUTIC ACTIVITIES: CPT

## 2025-03-03 PROCEDURE — 97140 MANUAL THERAPY 1/> REGIONS: CPT

## 2025-03-03 PROCEDURE — 97110 THERAPEUTIC EXERCISES: CPT

## 2025-03-03 ASSESSMENT — PAIN SCALES - GENERAL: PAINLEVEL_OUTOF10: 4

## 2025-03-03 NOTE — PROGRESS NOTES
10:00 AM Kimberli Sarmiento PT [facility]   3/17/2025 10:00 AM Kimberli Sarmiento PT [facility]   3/24/2025 10:00 AM Kimberli Sarmiento PT [facility]

## 2025-03-10 ENCOUNTER — HOSPITAL ENCOUNTER (OUTPATIENT)
Dept: PHYSICAL THERAPY | Age: 35
Setting detail: RECURRING SERIES
Discharge: HOME | End: 2025-03-13
Payer: MEDICAID

## 2025-03-10 PROCEDURE — 97140 MANUAL THERAPY 1/> REGIONS: CPT

## 2025-03-10 PROCEDURE — 97110 THERAPEUTIC EXERCISES: CPT

## 2025-03-10 PROCEDURE — 97530 THERAPEUTIC ACTIVITIES: CPT

## 2025-03-10 ASSESSMENT — PAIN SCALES - GENERAL: PAINLEVEL_OUTOF10: 4

## 2025-03-10 NOTE — PROGRESS NOTES
Dalila Herbert  : 1990  Primary: [facility] Medicaid (Medicaid Managed)  Secondary:  [facility] @ [address]  [facility] [address]  Phone: [phone]  Fax: [phone] Plan Frequency: 1x/week for 90 days    Plan of Care/Certification Expiration Date: 25        Plan of Care/Certification Expiration Date:  Plan of Care/Certification Expiration Date: 25    Frequency/Duration:   Plan Frequency: 1x/week for 90 days      Time In/Out:   Time In: 1002  Time Out: 1100     PT Visit Info:    Progress Note Due Date: 25 (or at 10th visit)  Total # of Visits to Date: 3      Visit Count:  3    OUTPATIENT PHYSICAL THERAPY:   Treatment Note 3/10/2025       Episode  (PFPT)               Treatment Diagnosis:    Other muscle spasm  Other lack of coordination  Stress incontinence (female) (male)  Contributing Diagnosis:  Bladder pain (R39.89), Dyspareunia (N94.1), Fecal smearing (Fecal soiling) (R15.1), Frequency of micturition (R35.0), Hesitancy of micturition (R39.11), Myalgia (M79.1), Nocturia (R35.1), Pelvic and perineal pain (R10.2), Pelvic floor dysfunction in female (M62.98), and Urgency of urination (R39.15)  Medical/Referring Diagnosis:    PFD (pelvic floor dysfunction)  Bladder pain    Referring Physician:  Анна Tiwari APRN - NP  MD Orders:  PT Eval and Treat   Return MD Appt:  Unspecified   Date of Onset:  Onset Date: 10/31/24 (date of surgery)     Allergies:   Patient has no allergy information on record.Adhesive  Restrictions/Precautions:   None      Interventions Planned (Treatment may consist of any combination of the following):     See Assessment Note    Subjective Comments:   Pelvic pain, bladder pain, pain worse after rectocele repair and midurethral sling placement, leakage better after urethral sling, pain with intercourse, UF 6-15x/night, 2-3x/night    Was in the ER because of intense abdominal pain, Sphincter of

## 2025-03-17 ENCOUNTER — HOSPITAL ENCOUNTER (OUTPATIENT)
Dept: PHYSICAL THERAPY | Age: 35
Setting detail: RECURRING SERIES
Discharge: HOME | End: 2025-03-20
Payer: MEDICAID

## 2025-03-17 PROCEDURE — 97530 THERAPEUTIC ACTIVITIES: CPT

## 2025-03-17 PROCEDURE — 97110 THERAPEUTIC EXERCISES: CPT

## 2025-03-17 ASSESSMENT — PAIN SCALES - GENERAL: PAINLEVEL_OUTOF10: 2

## 2025-03-17 NOTE — PROGRESS NOTES
Dalila Herbert  : 1990  Primary: [facility] Medicaid (Medicaid Managed)  Secondary:  [facility] @ [address]  [place] [address]  Phone: [phone]  Fax: [phone] Plan Frequency: 1x/week for 90 days    Plan of Care/Certification Expiration Date: 25        Plan of Care/Certification Expiration Date:  Plan of Care/Certification Expiration Date: 25    Frequency/Duration:   Plan Frequency: 1x/week for 90 days      Time In/Out:   Time In: 1004  Time Out: 1103     PT Visit Info:    Progress Note Due Date: 25 (or at 10th visit)  Total # of Visits to Date: 4      Visit Count:  4    OUTPATIENT PHYSICAL THERAPY:   Treatment Note 3/17/2025       Episode  (PFPT)               Treatment Diagnosis:    Other muscle spasm  Other lack of coordination  Stress incontinence (female) (male)  Contributing Diagnosis:  Bladder pain (R39.89), Dyspareunia (N94.1), Fecal smearing (Fecal soiling) (R15.1), Frequency of micturition (R35.0), Hesitancy of micturition (R39.11), Myalgia (M79.1), Nocturia (R35.1), Pelvic and perineal pain (R10.2), Pelvic floor dysfunction in female (M62.98), and Urgency of urination (R39.15)  Medical/Referring Diagnosis:    PFD (pelvic floor dysfunction)  Bladder pain    Referring Physician:  Анна Tiwari APRN - NP  MD Orders:  PT Eval and Treat   Return MD Appt:  Unspecified   Date of Onset:  Onset Date: 10/31/24 (date of surgery)     Allergies:   Patient has no allergy information on record.Adhesive  Restrictions/Precautions:   None      Interventions Planned (Treatment may consist of any combination of the following):     See Assessment Note    Subjective Comments:   Pelvic pain, bladder pain, pain worse after rectocele repair and midurethral sling placement, leakage better after urethral sling, pain with intercourse, UF 6-15x/night, 2-3x/night    Started propanolol this week on Thursday (3/13/25) for migraines

## 2025-03-24 ENCOUNTER — HOSPITAL ENCOUNTER (OUTPATIENT)
Dept: PHYSICAL THERAPY | Age: 35
Setting detail: RECURRING SERIES
Discharge: HOME | End: 2025-03-27
Payer: MEDICAID

## 2025-03-24 PROCEDURE — 97530 THERAPEUTIC ACTIVITIES: CPT

## 2025-03-24 ASSESSMENT — PAIN SCALES - GENERAL: PAINLEVEL_OUTOF10: 3

## 2025-03-24 NOTE — PROGRESS NOTES
Dalila Herbert  : 1990  Primary: [facility] Medicaid (Medicaid Managed)  Secondary:  [facility] @ [address]  [place] [address]  Phone: [phone]  Fax: [phone] Plan Frequency: 1x/week for 90 days    Plan of Care/Certification Expiration Date: 25        Plan of Care/Certification Expiration Date:  Plan of Care/Certification Expiration Date: 25    Frequency/Duration:   Plan Frequency: 1x/week for 90 days      Time In/Out:   Time In: 1008  Time Out: 1041     PT Visit Info:    Progress Note Due Date: 25 (or at 10th visit)  Total # of Visits to Date: 5      Visit Count:  5    OUTPATIENT PHYSICAL THERAPY:   Treatment Note 3/24/2025       Episode  (PFPT)               Treatment Diagnosis:    Other muscle spasm  Other lack of coordination  Stress incontinence (female) (male)  Contributing Diagnosis:  Bladder pain (R39.89), Dyspareunia (N94.1), Fecal smearing (Fecal soiling) (R15.1), Frequency of micturition (R35.0), Hesitancy of micturition (R39.11), Myalgia (M79.1), Nocturia (R35.1), Pelvic and perineal pain (R10.2), Pelvic floor dysfunction in female (M62.98), and Urgency of urination (R39.15)  Medical/Referring Diagnosis:    PFD (pelvic floor dysfunction)  Bladder pain    Referring Physician:  Анна Tiwari APRN - NP  MD Orders:  PT Eval and Treat   Return MD Appt:  Unspecified   Date of Onset:  Onset Date: 10/31/24 (date of surgery)     Allergies:   Patient has no allergy information on record.Adhesive  Restrictions/Precautions:   None      Interventions Planned (Treatment may consist of any combination of the following):     See Assessment Note    Subjective Comments:   Pelvic pain, bladder pain, pain worse after rectocele repair and midurethral sling placement, leakage better after urethral sling, pain with intercourse, UF 6-15x/night, 2-3x/night    Started propanolol on 3/13/25 for migraines and is not sleeping

## 2025-04-07 ENCOUNTER — HOSPITAL ENCOUNTER (OUTPATIENT)
Dept: PHYSICAL THERAPY | Age: 35
Setting detail: RECURRING SERIES
Discharge: HOME | End: 2025-04-10
Payer: MEDICAID

## 2025-04-07 PROCEDURE — 97530 THERAPEUTIC ACTIVITIES: CPT

## 2025-04-07 PROCEDURE — 97110 THERAPEUTIC EXERCISES: CPT

## 2025-04-07 ASSESSMENT — PAIN SCALES - GENERAL: PAINLEVEL_OUTOF10: 0

## 2025-04-07 NOTE — PROGRESS NOTES
urge suppression as needed.  Variation from POC: N/A    >Total Treatment Billable Duration: 55 minutes  Time In: 1004  Time Out: 1100    Kimberli Sarmiento PT         Charge Capture  Events  [employer] Portal  Appt Desk  Attendance Report     Future Appointments   Date Time Provider Department Center   4/14/2025 10:00 AM Kimberli Sarmiento PT [facility]   4/21/2025 10:00 AM Kimberli Sarmiento PT [facility]   4/28/2025 10:00 AM Kimberli Sarmiento PT [facility]

## 2025-04-14 ENCOUNTER — HOSPITAL ENCOUNTER (OUTPATIENT)
Dept: PHYSICAL THERAPY | Age: 35
Setting detail: RECURRING SERIES
Discharge: HOME | End: 2025-04-17
Payer: MEDICAID

## 2025-04-14 PROCEDURE — 97140 MANUAL THERAPY 1/> REGIONS: CPT

## 2025-04-14 PROCEDURE — 97530 THERAPEUTIC ACTIVITIES: CPT

## 2025-04-14 ASSESSMENT — PAIN SCALES - GENERAL: PAINLEVEL_OUTOF10: 0

## 2025-04-14 NOTE — PROGRESS NOTES
Dalila Herbert  : 1990  Primary: [facility] Medicaid (Medicaid Managed)  Secondary:  [facility] [place] [place] @ [address]  [place] [address]  Phone: [phone]  Fax: [phone] Plan Frequency: 1x/week for 90 days    Plan of Care/Certification Expiration Date: 25        Plan of Care/Certification Expiration Date:  Plan of Care/Certification Expiration Date: 25    Frequency/Duration:   Plan Frequency: 1x/week for 90 days      Time In/Out:   Time In: 1001  Time Out: 1058     PT Visit Info:    Progress Note Due Date: 25 (or at 10th visit)  Total # of Visits to Date: 7      Visit Count:  7    OUTPATIENT PHYSICAL THERAPY:   Treatment Note 2025       Episode  (PFPT)               Treatment Diagnosis:    Other muscle spasm  Other lack of coordination  Stress incontinence (female) (male)  Contributing Diagnosis:  Bladder pain (R39.89), Dyspareunia (N94.1), Fecal smearing (Fecal soiling) (R15.1), Frequency of micturition (R35.0), Hesitancy of micturition (R39.11), Myalgia (M79.1), Nocturia (R35.1), Pelvic and perineal pain (R10.2), Pelvic floor dysfunction in female (M62.98), and Urgency of urination (R39.15)  Medical/Referring Diagnosis:    PFD (pelvic floor dysfunction)  Bladder pain    Referring Physician:  Анна Tiwari APRN - NP  MD Orders:  PT Eval and Treat   Return MD Appt:  Unspecified   Date of Onset:  Onset Date: 10/31/24 (date of surgery)     Allergies:   Patient has no allergy information on record.Adhesive  Restrictions/Precautions:   None      Interventions Planned (Treatment may consist of any combination of the following):     See Assessment Note    Subjective Comments:   Pelvic pain, bladder pain, pain worse after rectocele repair and midurethral sling placement, leakage better after urethral sling, pain with intercourse, UF 6-15x/night, 2-3x/night    Gastro started her on nortriptyline a couple of weeks ago and it 
[place]

## 2025-04-21 ENCOUNTER — HOSPITAL ENCOUNTER (OUTPATIENT)
Dept: PHYSICAL THERAPY | Age: 35
Setting detail: RECURRING SERIES
Discharge: HOME | End: 2025-04-24
Payer: MEDICAID

## 2025-04-21 PROCEDURE — 97530 THERAPEUTIC ACTIVITIES: CPT

## 2025-04-21 ASSESSMENT — PAIN SCALES - GENERAL: PAINLEVEL_OUTOF10: 0

## 2025-04-21 NOTE — PROGRESS NOTES
fibrosis, she thinks it explains her RUQ pain, follows-up with gastro on May 8 to discuss  Still taking nortriptyline    Not eating a lot, feels nauseous, can get one meal per day at best  Trying to do high protein (not doing as well on the high fiber), as her GYN requested    Started propanolol on 3/13/25 for migraines, has not had much of a headache since she started it    No urinary leakage since last visit  Easier to wait longer, not thinking about it as much anymore, going about every 2-3 hours  No bladder pain recently at all  Not waking up with pain like she's used to    Has not had any attempts with intercourse recently    Exercises going well, can feel more of a relaxation now    Saw a new GYN recently who was wonderful, with her PCOS she wants her to do high protein and high fiber diet, and do HIIT workout  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 Intermediate PFM Tender? Deep PFM Tender?   Superficial Transverse Perineal [] Right  [] Left  []DNT Deep Transverse Perineal [] Right  [] Left  []DNT Puborectalis [] Right  [x] Left  []DNT   Ischiocavernosus [] Right  [] Left  []DNT Compressor Urethra [] Right  [] Left  []DNT Pubococcygeus [] Right  [] Left  []DNT   Bulbocavernosus [] Right  [] Left  []DNT   Iliococcygeus [] Right  [] Left  []DNT       Obturator Internus [] Right  [] Left  []DNT       Coccygeus [] Right  [] Left  [x]DNT         3/24/25:  BP: 118/88 mmHg  HR: 76 bpm    3/10/25:   Strength:   Left Right   Hip flexion (L2/3) 4/5 4+/5   Hip extension (L4-S1) 4-/5 4/5   Hip

## 2025-04-28 ENCOUNTER — APPOINTMENT (OUTPATIENT)
Dept: PHYSICAL THERAPY | Age: 35
End: 2025-04-28
Payer: MEDICAID